# Patient Record
Sex: MALE | Race: WHITE | NOT HISPANIC OR LATINO | Employment: STUDENT | URBAN - METROPOLITAN AREA
[De-identification: names, ages, dates, MRNs, and addresses within clinical notes are randomized per-mention and may not be internally consistent; named-entity substitution may affect disease eponyms.]

---

## 2023-04-13 ENCOUNTER — LAB (OUTPATIENT)
Dept: LAB | Facility: LAB | Age: 23
End: 2023-04-13
Payer: COMMERCIAL

## 2023-04-13 ENCOUNTER — OFFICE VISIT (OUTPATIENT)
Dept: PEDIATRICS | Facility: CLINIC | Age: 23
End: 2023-04-13
Payer: COMMERCIAL

## 2023-04-13 VITALS
HEART RATE: 64 BPM | SYSTOLIC BLOOD PRESSURE: 129 MMHG | WEIGHT: 170.19 LBS | HEIGHT: 70 IN | BODY MASS INDEX: 24.37 KG/M2 | DIASTOLIC BLOOD PRESSURE: 70 MMHG

## 2023-04-13 DIAGNOSIS — R53.83 FATIGUE, UNSPECIFIED TYPE: ICD-10-CM

## 2023-04-13 DIAGNOSIS — Z00.00 ENCOUNTER FOR ROUTINE ADULT HEALTH EXAMINATION WITHOUT ABNORMAL FINDINGS: ICD-10-CM

## 2023-04-13 DIAGNOSIS — Z11.3 ROUTINE SCREENING FOR STI (SEXUALLY TRANSMITTED INFECTION): ICD-10-CM

## 2023-04-13 DIAGNOSIS — Z00.00 ENCOUNTER FOR ROUTINE ADULT HEALTH EXAMINATION WITHOUT ABNORMAL FINDINGS: Primary | ICD-10-CM

## 2023-04-13 PROBLEM — F41.9 ANXIETY: Status: RESOLVED | Noted: 2023-04-13 | Resolved: 2023-04-13

## 2023-04-13 PROBLEM — R10.9 ABDOMINAL PAIN: Status: RESOLVED | Noted: 2023-04-13 | Resolved: 2023-04-13

## 2023-04-13 PROBLEM — J03.00 STREP TONSILLITIS: Status: RESOLVED | Noted: 2023-04-13 | Resolved: 2023-04-13

## 2023-04-13 PROBLEM — M93.20 OSTEOCHONDRITIS DISSECANS: Status: RESOLVED | Noted: 2023-04-13 | Resolved: 2023-04-13

## 2023-04-13 PROBLEM — D56.3 BETA THALASSEMIA MINOR: Status: ACTIVE | Noted: 2023-04-13

## 2023-04-13 PROBLEM — M84.374A: Status: RESOLVED | Noted: 2023-04-13 | Resolved: 2023-04-13

## 2023-04-13 PROBLEM — H69.92 DYSFUNCTION OF LEFT EUSTACHIAN TUBE: Status: RESOLVED | Noted: 2021-07-27 | Resolved: 2023-04-13

## 2023-04-13 PROBLEM — H61.23 BILATERAL IMPACTED CERUMEN: Status: RESOLVED | Noted: 2023-04-13 | Resolved: 2023-04-13

## 2023-04-13 PROBLEM — H66.92 LEFT ACUTE OTITIS MEDIA: Status: RESOLVED | Noted: 2023-04-13 | Resolved: 2023-04-13

## 2023-04-13 PROBLEM — H92.09 EAR PAIN: Status: RESOLVED | Noted: 2023-04-13 | Resolved: 2023-04-13

## 2023-04-13 PROBLEM — F12.10 MARIJUANA ABUSE: Status: RESOLVED | Noted: 2023-04-13 | Resolved: 2023-04-13

## 2023-04-13 PROBLEM — M25.571 ACUTE RIGHT ANKLE PAIN: Status: RESOLVED | Noted: 2023-04-13 | Resolved: 2023-04-13

## 2023-04-13 PROBLEM — H93.19 TINNITUS: Status: RESOLVED | Noted: 2021-07-27 | Resolved: 2023-04-13

## 2023-04-13 PROBLEM — R00.2 PALPITATIONS: Status: RESOLVED | Noted: 2023-04-13 | Resolved: 2023-04-13

## 2023-04-13 PROBLEM — S93.401A SPRAIN OF RIGHT ANKLE: Status: RESOLVED | Noted: 2023-04-13 | Resolved: 2023-04-13

## 2023-04-13 PROBLEM — E80.6 HYPERBILIRUBINEMIA: Status: RESOLVED | Noted: 2023-04-13 | Resolved: 2023-04-13

## 2023-04-13 LAB
ALANINE AMINOTRANSFERASE (SGPT) (U/L) IN SER/PLAS: 15 U/L (ref 10–52)
ALBUMIN (G/DL) IN SER/PLAS: 4.8 G/DL (ref 3.4–5)
ALKALINE PHOSPHATASE (U/L) IN SER/PLAS: 64 U/L (ref 33–120)
ANION GAP IN SER/PLAS: 11 MMOL/L (ref 10–20)
ASPARTATE AMINOTRANSFERASE (SGOT) (U/L) IN SER/PLAS: 17 U/L (ref 9–39)
BILIRUBIN TOTAL (MG/DL) IN SER/PLAS: 1.2 MG/DL (ref 0–1.2)
CALCIUM (MG/DL) IN SER/PLAS: 9.9 MG/DL (ref 8.6–10.6)
CARBON DIOXIDE, TOTAL (MMOL/L) IN SER/PLAS: 27 MMOL/L (ref 21–32)
CHLAMYDIA TRACH., AMPLIFIED: NEGATIVE
CHLORIDE (MMOL/L) IN SER/PLAS: 106 MMOL/L (ref 98–107)
CHOLESTEROL (MG/DL) IN SER/PLAS: 153 MG/DL (ref 0–199)
CHOLESTEROL IN HDL (MG/DL) IN SER/PLAS: 33.1 MG/DL
CHOLESTEROL/HDL RATIO: 4.6
CREATININE (MG/DL) IN SER/PLAS: 0.81 MG/DL (ref 0.5–1.3)
GFR MALE: >90 ML/MIN/1.73M2
GLUCOSE (MG/DL) IN SER/PLAS: 80 MG/DL (ref 74–99)
LDL: 56 MG/DL (ref 0–119)
N. GONORRHEA, AMPLIFIED: NEGATIVE
NON HDL CHOLESTEROL: 120 MG/DL (ref 0–149)
POTASSIUM (MMOL/L) IN SER/PLAS: 4.2 MMOL/L (ref 3.5–5.3)
PROTEIN TOTAL: 7.1 G/DL (ref 6.4–8.2)
SODIUM (MMOL/L) IN SER/PLAS: 140 MMOL/L (ref 136–145)
TRIGLYCERIDE (MG/DL) IN SER/PLAS: 318 MG/DL (ref 0–149)
UREA NITROGEN (MG/DL) IN SER/PLAS: 12 MG/DL (ref 6–23)
VLDL: 64 MG/DL (ref 0–40)

## 2023-04-13 PROCEDURE — 87591 N.GONORRHOEAE DNA AMP PROB: CPT

## 2023-04-13 PROCEDURE — 99395 PREV VISIT EST AGE 18-39: CPT | Performed by: PEDIATRICS

## 2023-04-13 PROCEDURE — 87389 HIV-1 AG W/HIV-1&-2 AB AG IA: CPT

## 2023-04-13 PROCEDURE — 85025 COMPLETE CBC W/AUTO DIFF WBC: CPT

## 2023-04-13 PROCEDURE — 36415 COLL VENOUS BLD VENIPUNCTURE: CPT

## 2023-04-13 PROCEDURE — 87491 CHLMYD TRACH DNA AMP PROBE: CPT

## 2023-04-13 PROCEDURE — 80061 LIPID PANEL: CPT

## 2023-04-13 PROCEDURE — 80053 COMPREHEN METABOLIC PANEL: CPT

## 2023-04-13 NOTE — PROGRESS NOTES
Subjective     Rayray Alfaro is here with his mother for his annual WCC.    Parental Issues:  Questions or concerns:     He is having some left sided groin pain and wants to make sure he doesn't have a hernita.  The pain occurs when he bends forward or strains.  It has been present for about a month.  Wants STI testing due to having unprotected sex -- no symptoms.    He has had an intermittent feeling of dizziness/dehydration at times -- no syncope.    Nutrition, Elimination, and Sleep:  Nutrition:  well-balanced diet, takes foods from each food group  Elimination:  normal frequency and quality of stool  Sleep:  normal for age    Social:  Peer relations:  no concerns  Family relations:  no concerns  School performance:  no concerns  Teen questionnaire:  reviewed  Activities:  working as an     Using Marijuana intermittently, but not excessively    Confidential Adolescent Questionnaire Reviewed and Discussed      Objective   Growth chart reviewed.  General:  Well-appearing  Well-hydrated  No acute distress   Head:  Normocephalic   Eyes:  Lids and conjunctivae normal  Sclerae white  Pupils equal and reactive   ENT:  Ears:  TMs normal bilaterally  Mouth:  mucosa moist; no visible lesions  Throat:  OP moist and clear; uvula midline  Neck:  supple; no thyroid enlargement   Respiratory:  Respiratory rate:  normal  Air exchange:  normal   Adventitious breath sounds:  none  Accessory muscle use:  none   Heart:  Rate and rhythm:  regular  Murmur:  none    Abdomen:  Palpation:  soft, non-tender, non-distended, no masses  Organs:  no HSM  Bowel sounds:  normal   :  Normal external genitalia  Blayne stage:  V No obvious hernia   MSK: Range of motion:  grossly normal in all joints  Swelling:  none  Muscle bulk and strength:  grossly normal   Skin:  Warm and well-perfused  No rashes   Lymphatic: No nodes larger than 1 cm palpated  No firm or fixed nodes palpated   Neuro:  Alert  Moves all extremities  spontaneously  CN:  grossly intact  Tone:  normal      Assessment/Plan   Rayray Alfaro is a healthy and thriving teenager.    - Anticipatory guidance regarding development, safety, nutrition, physical activity, and sleep reviewed.  - Growth:  appropriate for age  - Development:  active and social   - Social:  Appropriate for age.  Confidential adolescent questionnaire reviewed and discussed. Age appropriate anticipatory guidance given.  - Vaccines:  as documented  - Return in 1 year for annual well exam or sooner if concerns arise.  -Rayray will start a workout programming incorporating core strength and stretching -- consider seeing general surgery if symptoms of a hernia persist  -Screening labs were ordered.  -Hearing was done today at Rayray's request

## 2023-04-14 LAB
BASOPHILS (10*3/UL) IN BLOOD BY AUTOMATED COUNT: 0.03 X10E9/L (ref 0–0.1)
BASOPHILS/100 LEUKOCYTES IN BLOOD BY AUTOMATED COUNT: 0.4 % (ref 0–2)
EOSINOPHILS (10*3/UL) IN BLOOD BY AUTOMATED COUNT: 0.12 X10E9/L (ref 0–0.7)
EOSINOPHILS/100 LEUKOCYTES IN BLOOD BY AUTOMATED COUNT: 1.7 % (ref 0–6)
ERYTHROCYTE DISTRIBUTION WIDTH (RATIO) BY AUTOMATED COUNT: 17.7 % (ref 11.5–14.5)
ERYTHROCYTE MEAN CORPUSCULAR HEMOGLOBIN CONCENTRATION (G/DL) BY AUTOMATED: 29.5 G/DL (ref 32–36)
ERYTHROCYTE MEAN CORPUSCULAR VOLUME (FL) BY AUTOMATED COUNT: 64 FL (ref 80–100)
ERYTHROCYTES (10*6/UL) IN BLOOD BY AUTOMATED COUNT: 6.92 X10E12/L (ref 4.5–5.9)
HEMATOCRIT (%) IN BLOOD BY AUTOMATED COUNT: 44.1 % (ref 41–52)
HEMOGLOBIN (G/DL) IN BLOOD: 13 G/DL (ref 13.5–17.5)
HIV 1/ 2 AG/AB SCREEN: NONREACTIVE
IMMATURE GRANULOCYTES/100 LEUKOCYTES IN BLOOD BY AUTOMATED COUNT: 0.4 % (ref 0–0.9)
LEUKOCYTES (10*3/UL) IN BLOOD BY AUTOMATED COUNT: 7.2 X10E9/L (ref 4.4–11.3)
LYMPHOCYTES (10*3/UL) IN BLOOD BY AUTOMATED COUNT: 2.11 X10E9/L (ref 1.2–4.8)
LYMPHOCYTES/100 LEUKOCYTES IN BLOOD BY AUTOMATED COUNT: 29.4 % (ref 13–44)
MONOCYTES (10*3/UL) IN BLOOD BY AUTOMATED COUNT: 0.59 X10E9/L (ref 0.1–1)
MONOCYTES/100 LEUKOCYTES IN BLOOD BY AUTOMATED COUNT: 8.2 % (ref 2–10)
NEUTROPHILS (10*3/UL) IN BLOOD BY AUTOMATED COUNT: 4.29 X10E9/L (ref 1.2–7.7)
NEUTROPHILS/100 LEUKOCYTES IN BLOOD BY AUTOMATED COUNT: 59.9 % (ref 40–80)
NRBC (PER 100 WBCS) BY AUTOMATED COUNT: 0 /100 WBC (ref 0–0)
PLATELETS (10*3/UL) IN BLOOD AUTOMATED COUNT: 297 X10E9/L (ref 150–450)

## 2023-08-18 ENCOUNTER — OFFICE VISIT (OUTPATIENT)
Dept: PRIMARY CARE | Facility: CLINIC | Age: 23
End: 2023-08-18
Payer: COMMERCIAL

## 2023-08-18 VITALS
BODY MASS INDEX: 24.22 KG/M2 | OXYGEN SATURATION: 98 % | HEIGHT: 71 IN | DIASTOLIC BLOOD PRESSURE: 73 MMHG | WEIGHT: 173 LBS | SYSTOLIC BLOOD PRESSURE: 118 MMHG | HEART RATE: 59 BPM

## 2023-08-18 DIAGNOSIS — F41.9 ANXIETY: ICD-10-CM

## 2023-08-18 DIAGNOSIS — H93.13 TINNITUS OF BOTH EARS: Primary | ICD-10-CM

## 2023-08-18 DIAGNOSIS — D50.8 OTHER IRON DEFICIENCY ANEMIA: ICD-10-CM

## 2023-08-18 PROBLEM — H69.92 DYSFUNCTION OF LEFT EUSTACHIAN TUBE: Status: ACTIVE | Noted: 2021-07-27

## 2023-08-18 PROCEDURE — 99204 OFFICE O/P NEW MOD 45 MIN: CPT | Performed by: STUDENT IN AN ORGANIZED HEALTH CARE EDUCATION/TRAINING PROGRAM

## 2023-08-18 RX ORDER — MULTIVITAMIN
1 TABLET ORAL
COMMUNITY

## 2023-08-18 RX ORDER — FERROUS SULFATE 325(65) MG
65 TABLET ORAL
Qty: 36 TABLET | Refills: 0 | Status: SHIPPED | OUTPATIENT
Start: 2023-08-18 | End: 2023-11-08

## 2023-08-18 NOTE — PATIENT INSTRUCTIONS
Thank you for coming in today!    Start iron supplement 3 times per week for next 3 months, then switch to an over the counter daily multivitamin WITH iron in it after that. You should continue the daily multivitamin with iron indefinitely.    Psychology referral within . You may also try [facility] in [place] or [facility].     Dermatology to evaluate and possibly remove that likely ingrown hair on your chest.     Follow up in 4-6 months with repeat cholesterol panel and blood counts at that time. Your TRIGLYCERIDES were high but not to point of needing medication but you should watch your fatty/fried/fast food intake to prevent this from becoming an issue long term.     Would recommend getting updated COVID shot and Flu shot this fall.     Let me know if you need anything else in the meantime!    Best,  Dr. Mendoza

## 2023-08-18 NOTE — PROGRESS NOTES
Rayray Alfaro is a 22 y.o. male seen in Clinic at /[facility] by Dr. Cosme Mendoza on 08/18/23 for routine care, as well as for management of the following chronic medical conditions: jannet thal minor, anxiety. He presents today to establish care.     #Tinnitus   -    - constantly exposed to high pitched sounds  - R ear feels obstructed  - higher pitched ringing in R ear, some asymmetry in hearing   - no trauma to the ear that he is aware of, uses Q-tips  - has been seen by ENT in the past, as well as audiology (last in 2021)   - NOT wooshing, more ringing  - does have seasonal allergies, has not been taking Allegra recently, also has done Flonase in the past   [X] ear wax removed from canals bilaterally today  [  ] updated audiology eval   [  ] mild anemia in setting of Beta Thal minor, MCV in low 60s, start iron supplementation, as may be slightly contributing     #Beta Thal Minor,   - recent CBC with mild microcytic anemia  - Iron supplementation   - long term MVI with iron     #Anxiety   - prior counseling  - may consider re-engagement   - money is stressor  [  ] re-referral today     Past Medical History: none   Past Medical History:   Diagnosis Date    Abdominal pain 04/13/2023    Acute right ankle pain 04/13/2023    Anxiety 04/13/2023    Dysfunction of left eustachian tube 07/27/2021    Ear pain 04/13/2023    Hyperbilirubinemia 04/13/2023    Left acute otitis media 04/13/2023    Marijuana abuse 04/13/2023    Osteochondritis dissecans 04/13/2023    Palpitations 04/13/2023    Sprain of right ankle 04/13/2023    Strep tonsillitis 04/13/2023    Stress fracture of tarsal bone of right foot 04/13/2023    Tinnitus 07/27/2021     Subspecialty Medical Care: prior ENT     Past Surgical History: urologic procedure in infancy   No past surgical history on file.    Medications:   Current Outpatient Medications:     multivitamin tablet, Take 1 tablet by mouth once daily., Disp: , Rfl:   Pharmacy: [facility]  "([place])    Allergies: NKDA; seasonal allergies   No Known Allergies    Immunizations: Tdap 2020-->due 2030; recommend Flu and COVID vaccine this fall     Family History:   - Breast Cancer (grandmother, late onset)   No family history on file.    Social History:   Home/Living Situation: lives at with mother, father, brother, and sister; feels safe at home   Education: [employer] for high school   Employment/Work/Vocational: works in music industry   Activities: music, hanging out with friends  Drug Use: MJ use, no tobacco use, rare alcohol use   Diet: \"could be better\"   Sexuality/Contraception/Menstrual History:   Suicide/Depression/Anxiety: known anxiety   Sleep: no sleep concerns   Legal/Guardian: mom and dad, home [phone]  Contact Information: [phone]    Visit Vitals  /73   Pulse 59   Ht 1.803 m (5' 11\")   Wt 78.5 kg (173 lb)   SpO2 98%   BMI 24.13 kg/m²   Smoking Status Former   BSA 1.98 m²      PHYSICAL EXAM:   General: well appearing, NAD, pleasant and engaged in encounter    HEENT: NCAT, MMM; Tms initially obstructed by wax but successfully removed with curette bilaterally   CV: RRR, no m/r/g  PULM: CTAB, non-labored respirations   ABD: soft, NT, ND, + bowel sounds   : no suprapubic or CVA tenderness   EXT: WWP, no significant edema   SKIN: no rashes noted   NEURO: A&Ox4, symmetric facies, no gross motor or sensory deficits, normal gait  PSYCH: pleasant mood, appropriate affect     Assessment/Plan    Rayray Alfaro is a 22 y.o. male seen in Clinic at /[facility] by Dr. Cosme Mendoza on 08/18/23 for routine care, as well as for management of the following chronic medical conditions: jannet thal minor, anxiety. He presents today to establish care.     #Tinnitus   -    - constantly exposed to high pitched sounds  - R ear feels obstructed  - higher pitched ringing in R ear, some asymmetry in hearing   - no trauma to the ear that he is aware of, uses " Q-tips  - has been seen by ENT in the past, as well as audiology (last in 2021)   - NOT wooshing, more ringing  - does have seasonal allergies, has not been taking Allegra recently, also has done Flonase in the past   [X] ear wax removed from canals bilaterally today  [  ] updated audiology eval   [  ] mild anemia in setting of Beta Thal minor, MCV in low 60s, start iron supplementation, as may be slightly contributing     #Beta Thal Minor,   - recent CBC with mild microcytic anemia  - Iron supplementation   - long term MVI with iron     #Anxiety   - prior counseling  - may consider re-engagement   - money is stressor  [  ] re-referral today     #Health Maintenance   - Vision, Hearing screens: audiology referral today, recommend vision eval (never worn corrective lenses in the past)   - Counseling regarding alcohol/tobacco/drug use: MJ use as above   - Depression screen: +anxiety (prior counseling, interested in re-engaging), depression   - BMI, Lipid, A1C screening and nutritional/exercise counseling: recent labs reviewed; repeat lipids in 3-6 months   - Blood Pressure: WNL   - Safe Sexual Practices, STI, HIV screening: recent negative testing 04/2023    #Transition of Care/Young Adult Care  - Health Literacy Assessment: excellent   - Medications: Active medications reviewed and updated  - Allergies: Reviewed and updated   - Immunizations: UTD, annual flu and updated COVID boosters recommended   - Resources Provided: psychology, dermatology, audiology, Iron supplementation     Return to clinic in 4-6 months for follow-up.     Cosme Mendoza MD  Internal Medicine-Pediatrics   [facility] [place] [address], [address]  P: [phone], F: [phone]

## 2023-10-24 ENCOUNTER — OFFICE VISIT (OUTPATIENT)
Dept: PRIMARY CARE | Facility: CLINIC | Age: 23
End: 2023-10-24
Payer: COMMERCIAL

## 2023-10-24 VITALS
WEIGHT: 180 LBS | OXYGEN SATURATION: 98 % | HEART RATE: 68 BPM | SYSTOLIC BLOOD PRESSURE: 114 MMHG | BODY MASS INDEX: 25.2 KG/M2 | DIASTOLIC BLOOD PRESSURE: 71 MMHG | HEIGHT: 71 IN

## 2023-10-24 DIAGNOSIS — Z86.69 HISTORY OF IMPACTED EAR WAX: Primary | ICD-10-CM

## 2023-10-24 DIAGNOSIS — D50.8 OTHER IRON DEFICIENCY ANEMIA: ICD-10-CM

## 2023-10-24 DIAGNOSIS — Z23 IMMUNIZATION DUE: ICD-10-CM

## 2023-10-24 DIAGNOSIS — E78.1 HYPERTRIGLYCERIDEMIA: ICD-10-CM

## 2023-10-24 DIAGNOSIS — H93.13 TINNITUS OF BOTH EARS: ICD-10-CM

## 2023-10-24 PROCEDURE — 90686 IIV4 VACC NO PRSV 0.5 ML IM: CPT | Performed by: STUDENT IN AN ORGANIZED HEALTH CARE EDUCATION/TRAINING PROGRAM

## 2023-10-24 PROCEDURE — 99213 OFFICE O/P EST LOW 20 MIN: CPT | Performed by: STUDENT IN AN ORGANIZED HEALTH CARE EDUCATION/TRAINING PROGRAM

## 2023-10-24 PROCEDURE — 90471 IMMUNIZATION ADMIN: CPT | Performed by: STUDENT IN AN ORGANIZED HEALTH CARE EDUCATION/TRAINING PROGRAM

## 2023-10-24 NOTE — PROGRESS NOTES
Rayray Alfaro is a 22 y.o. male seen in Clinic at /[facility] by Dr. Cosme Mendoza on 10/24/23 for routine care, as well as for management of the following chronic medical conditions: jannet thal minor, anxiety. He presents today with concern for ear fullness. Bilateral Tms occluded by wax, successfully removed with curette without issue. Has been taking iron reliably for last ~3 weeks given beta thal minor with microcytic anemia and tinnitus concerns. Will repeat labs if continues to reliably take at time of next visit. Audiology visit pending, has been busy with work but planning to schedule. Flu shot given today, updated COVID booster advised this fall.    ACUTE CONCERNS:   #Ear Fullness  - bilateral cerumen impaction, s/p successful removal with curette     CHRONIC MEDICAL CONDITIONS   #Tinnitus   -    - constantly exposed to high pitched sounds  - higher pitched ringing in R ear, some asymmetry in hearing   - no trauma to the ear that he is aware of, uses Q-tips  - has been seen by ENT in the past, as well as audiology (last in 2021)   - NOT wooshing, more ringing  - does have seasonal allergies, has not been taking Allegra recently, also has done Flonase in the past   [  ] previously referred to audiology, recommended follow up   - mild anemia in setting of Beta Thal minor, MCV in low 60s, previously recommended iron supplementation, as may be slightly contributing: compliance in last few weeks     #Beta Thal Minor,   - recent CBC with mild microcytic anemia  - Iron supplementation   - long term MVI with iron   [  ] repeat labs at next visit pending continued compliance     #Anxiety   - prior counseling  - may consider re-engagement; previously referred   - money is stressor    #Elevated Tgs  - per lipid screening in 2023  - low HDL but reassuring TC and LDL  - healthy dietary habits discussed/reviewed    Past Medical History: none   Past Medical History:   Diagnosis Date    Abdominal pain  "04/13/2023    Acute right ankle pain 04/13/2023    Anxiety 04/13/2023    Dysfunction of left eustachian tube 07/27/2021    Ear pain 04/13/2023    Hyperbilirubinemia 04/13/2023    Left acute otitis media 04/13/2023    Marijuana abuse 04/13/2023    Osteochondritis dissecans 04/13/2023    Palpitations 04/13/2023    Sprain of right ankle 04/13/2023    Strep tonsillitis 04/13/2023    Stress fracture of tarsal bone of right foot 04/13/2023    Tinnitus 07/27/2021     Subspecialty Medical Care: prior ENT     Past Surgical History: urologic procedure in infancy   No past surgical history on file.    Medications:   Current Outpatient Medications:     ferrous sulfate (FerrouSuL) 325 (65 Fe) MG tablet, Take 1 tablet (65 mg of iron) by mouth once a day on Monday, Wednesday, and Friday., Disp: 36 tablet, Rfl: 0    multivitamin tablet, Take 1 tablet by mouth once daily., Disp: , Rfl:   Pharmacy: [facility] ([place])    Allergies: NKDA; seasonal allergies   No Known Allergies    Immunizations: Tdap 2020-->due 2030; recommend Flu and COVID vaccine this fall   - Flu today     Family History:   - Breast Cancer (grandmother, late onset)   No family history on file.    Social History:   Home/Living Situation: lives at with mother, father, brother, and sister; feels safe at home   Education: [employer] for high school   Employment/Work/Vocational: works in music industry   Activities: music, hanging out with friends  Drug Use: MJ use, no tobacco use, rare alcohol use   Diet: \"could be better\"   Sexuality/Contraception/Menstrual History:   Suicide/Depression/Anxiety: known anxiety   Sleep: no sleep concerns   Legal/Guardian: mom and dad, home [phone]  Contact Information: [phone]    Visit Vitals  /71   Pulse 68   Ht 1.803 m (5' 11\")   Wt 81.6 kg (180 lb)   SpO2 98%   BMI 25.10 kg/m²   Smoking Status Every Day   BSA 2.02 m²      PHYSICAL EXAM:   General: well appearing, NAD, pleasant and engaged in encounter  "   HEENT: NCAT, MMM; Tms initially obstructed by wax but successfully removed with curette bilaterally   CV: RRR, no m/r/g  PULM: CTAB, non-labored respirations   ABD: soft, NT, ND, + bowel sounds   : no suprapubic or CVA tenderness   EXT: WWP, no significant edema   SKIN: no rashes noted   NEURO: A&Ox4, symmetric facies, no gross motor or sensory deficits, normal gait  PSYCH: pleasant mood, appropriate affect     Assessment/Plan    Rayray Alfaro is a 22 y.o. male seen in Clinic at /[facility] by Dr. Cosme Mendoza on 10/24/23 for routine care, as well as for management of the following chronic medical conditions: jannet thal minor, anxiety. He presents today with concern for ear fullness. Bilateral Tms occluded by wax, successfully removed with curette without issue. Has been taking iron reliably for last ~3 weeks given beta thal minor with microcytic anemia and tinnitus concerns. Will repeat labs if continues to reliably take at time of next visit. Audiology visit pending, has been busy with work but planning to schedule. Flu shot given today, updated COVID booster advised this fall.    ACUTE CONCERNS:   #Ear Fullness  - bilateral cerumen impaction, s/p successful removal with curette     CHRONIC MEDICAL CONDITIONS   #Tinnitus   -    - constantly exposed to high pitched sounds  - higher pitched ringing in R ear, some asymmetry in hearing   - no trauma to the ear that he is aware of, uses Q-tips  - has been seen by ENT in the past, as well as audiology (last in 2021)   - NOT wooshing, more ringing  - does have seasonal allergies, has not been taking Allegra recently, also has done Flonase in the past   [  ] previously referred to audiology, recommended follow up   - mild anemia in setting of Beta Thal minor, MCV in low 60s, previously recommended iron supplementation, as may be slightly contributing: compliance in last few weeks     #Beta Thal Minor,   - recent CBC with mild microcytic anemia  -  Iron supplementation   - long term MVI with iron   [  ] repeat labs at next visit pending continued compliance     #Anxiety   - prior counseling  - may consider re-engagement; previously referred   - money is stressor    #Elevated Tgs  - per lipid screening in 2023  - low HDL but reassuring TC and LDL  - healthy dietary habits discussed/reviewed    #Health Maintenance   - Vision, Hearing screens: audiology referral pending, recommend vision eval (never worn corrective lenses in the past)   - Counseling regarding alcohol/tobacco/drug use: MJ use as above   - Depression screen: +anxiety (prior counseling, interested in re-engaging), depression   - BMI, Lipid, A1C screening and nutritional/exercise counseling: recent labs reviewed; repeat lipids in 3-6 months   - Blood Pressure: WNL   - Safe Sexual Practices, STI, HIV screening: recent negative testing 04/2023    #Transition of Care/Young Adult Care  - Health Literacy Assessment: excellent   - Medications: Active medications reviewed and updated  - Allergies: Reviewed and updated   - Immunizations: UTD, annual flu and updated COVID boosters recommended--Flu today   - Resources Provided: no new referrals     Return to clinic in 4-6 months for follow-up.     Cosme Mendoza MD  Internal Medicine-Pediatrics   [facility] [place] [address], [address]  P: [phone], F: [phone]

## 2023-11-08 DIAGNOSIS — D50.8 OTHER IRON DEFICIENCY ANEMIA: ICD-10-CM

## 2023-11-08 RX ORDER — FERROUS SULFATE 325(65) MG
65 TABLET ORAL
Qty: 36 TABLET | Refills: 3 | Status: SHIPPED | OUTPATIENT
Start: 2023-11-08 | End: 2024-10-09

## 2024-02-09 ENCOUNTER — OFFICE VISIT (OUTPATIENT)
Dept: PRIMARY CARE | Facility: CLINIC | Age: 24
End: 2024-02-09
Payer: COMMERCIAL

## 2024-02-09 ENCOUNTER — HOSPITAL ENCOUNTER (OUTPATIENT)
Dept: RADIOLOGY | Facility: CLINIC | Age: 24
Discharge: HOME | End: 2024-02-09
Payer: COMMERCIAL

## 2024-02-09 ENCOUNTER — LAB (OUTPATIENT)
Dept: LAB | Facility: LAB | Age: 24
End: 2024-02-09
Payer: COMMERCIAL

## 2024-02-09 VITALS
BODY MASS INDEX: 24.41 KG/M2 | WEIGHT: 175 LBS | HEART RATE: 64 BPM | DIASTOLIC BLOOD PRESSURE: 73 MMHG | OXYGEN SATURATION: 95 % | SYSTOLIC BLOOD PRESSURE: 110 MMHG

## 2024-02-09 DIAGNOSIS — J18.9 ATYPICAL PNEUMONIA: ICD-10-CM

## 2024-02-09 DIAGNOSIS — J02.9 PHARYNGITIS, UNSPECIFIED ETIOLOGY: ICD-10-CM

## 2024-02-09 DIAGNOSIS — J18.9 ATYPICAL PNEUMONIA: Primary | ICD-10-CM

## 2024-02-09 DIAGNOSIS — E78.1 HYPERTRIGLYCERIDEMIA: ICD-10-CM

## 2024-02-09 DIAGNOSIS — D50.9 IRON DEFICIENCY ANEMIA, UNSPECIFIED IRON DEFICIENCY ANEMIA TYPE: ICD-10-CM

## 2024-02-09 LAB
BASOPHILS # BLD AUTO: 0.01 X10*3/UL (ref 0–0.1)
BASOPHILS NFR BLD AUTO: 0.2 %
EOSINOPHIL # BLD AUTO: 0.03 X10*3/UL (ref 0–0.7)
EOSINOPHIL NFR BLD AUTO: 0.7 %
ERYTHROCYTE [DISTWIDTH] IN BLOOD BY AUTOMATED COUNT: 18 % (ref 11.5–14.5)
FERRITIN SERPL-MCNC: 367 NG/ML (ref 20–300)
HCT VFR BLD AUTO: 47 % (ref 41–52)
HGB BLD-MCNC: 14.3 G/DL (ref 13.5–17.5)
HGB RETIC QN: 19 PG (ref 28–38)
IMM GRANULOCYTES # BLD AUTO: 0.01 X10*3/UL (ref 0–0.7)
IMM GRANULOCYTES NFR BLD AUTO: 0.2 % (ref 0–0.9)
IMMATURE RETIC FRACTION: 3 %
IRON SATN MFR SERPL: 37 % (ref 25–45)
IRON SERPL-MCNC: 120 UG/DL (ref 35–150)
LYMPHOCYTES # BLD AUTO: 2 X10*3/UL (ref 1.2–4.8)
LYMPHOCYTES NFR BLD AUTO: 49.5 %
MCH RBC QN AUTO: 19 PG (ref 26–34)
MCHC RBC AUTO-ENTMCNC: 30.4 G/DL (ref 32–36)
MCV RBC AUTO: 62 FL (ref 80–100)
MONOCYTES # BLD AUTO: 0.52 X10*3/UL (ref 0.1–1)
MONOCYTES NFR BLD AUTO: 12.9 %
NEUTROPHILS # BLD AUTO: 1.47 X10*3/UL (ref 1.2–7.7)
NEUTROPHILS NFR BLD AUTO: 36.5 %
NRBC BLD-RTO: 0 /100 WBCS (ref 0–0)
PLATELET # BLD AUTO: 221 X10*3/UL (ref 150–450)
POC RAPID STREP: NEGATIVE
RBC # BLD AUTO: 7.54 X10*6/UL (ref 4.5–5.9)
RETICS #: 0.04 X10*6/UL (ref 0.02–0.12)
RETICS/RBC NFR AUTO: 0.5 % (ref 0.5–2)
TIBC SERPL-MCNC: 327 UG/DL (ref 240–445)
UIBC SERPL-MCNC: 207 UG/DL (ref 110–370)
WBC # BLD AUTO: 4 X10*3/UL (ref 4.4–11.3)

## 2024-02-09 PROCEDURE — 36415 COLL VENOUS BLD VENIPUNCTURE: CPT

## 2024-02-09 PROCEDURE — 85025 COMPLETE CBC W/AUTO DIFF WBC: CPT

## 2024-02-09 PROCEDURE — 82728 ASSAY OF FERRITIN: CPT

## 2024-02-09 PROCEDURE — 83540 ASSAY OF IRON: CPT

## 2024-02-09 PROCEDURE — 87651 STREP A DNA AMP PROBE: CPT

## 2024-02-09 PROCEDURE — 87880 STREP A ASSAY W/OPTIC: CPT | Performed by: STUDENT IN AN ORGANIZED HEALTH CARE EDUCATION/TRAINING PROGRAM

## 2024-02-09 PROCEDURE — 80061 LIPID PANEL: CPT

## 2024-02-09 PROCEDURE — 71046 X-RAY EXAM CHEST 2 VIEWS: CPT

## 2024-02-09 PROCEDURE — 83550 IRON BINDING TEST: CPT

## 2024-02-09 PROCEDURE — 85045 AUTOMATED RETICULOCYTE COUNT: CPT

## 2024-02-09 PROCEDURE — 99214 OFFICE O/P EST MOD 30 MIN: CPT | Performed by: STUDENT IN AN ORGANIZED HEALTH CARE EDUCATION/TRAINING PROGRAM

## 2024-02-09 PROCEDURE — 71046 X-RAY EXAM CHEST 2 VIEWS: CPT | Performed by: RADIOLOGY

## 2024-02-09 RX ORDER — DOXYCYCLINE 100 MG/1
100 CAPSULE ORAL 2 TIMES DAILY
Qty: 10 CAPSULE | Refills: 0 | Status: SHIPPED | OUTPATIENT
Start: 2024-02-09 | End: 2024-02-14

## 2024-02-09 RX ORDER — PREDNISONE 20 MG/1
40 TABLET ORAL DAILY
Qty: 10 TABLET | Refills: 0 | Status: SHIPPED | OUTPATIENT
Start: 2024-02-09 | End: 2024-02-14

## 2024-02-09 NOTE — PATIENT INSTRUCTIONS
Strep testing sent     Chest X-Ray in [address]  Labs in [address] for iron levels    Prednisone and Doxycycline for atypical pneumonia  Both for 5 days  Prednisone once daily in the morning with food  Doxycycline twice daily in the morning and evening; also should be taken with food    Best  Dr. Mendoza

## 2024-02-09 NOTE — PROGRESS NOTES
Rayray Alfaro is a 23 y.o. male seen in Clinic at /[facility] by Dr. Cosme Mendoza on 02/09/24 for routine care, as well as for management of the following chronic medical conditions: jannet thal minor, anxiety. He presents today with concern for sore throat, fever, myalgias, and chest congestion.     ACUTE CONCERNS:   #Pharyngitis, Concern for Atypical PNA, possible Flu   - Symptoms started 4-5 days ago  - Recent sick contacts with positive strep testing   - Notes he has had fever up to 102 in preceding days, myalgias, congestion, sore throat  - Temp 98.7 in office today   - No profound SOB  - Does not some chest pain/discomfort  - Examination today in office with diffuse wheezing, L sided crackles  - No respiratory distress or labored respirations   [  ] given pulmonary exam, CXR  [  ] strep testing given positive testing in close contacts: rapid testing negative  [  ] high concern for atypical PNA; with degree of wheezing, will treat with both antibiotics (Doxy) and steroids (Prednisone 40mg daily x5 days)  [  ] home COVID testing negative; defer Flu testing, as patient now 4-5 days out from symptom onset, no indication for Oseltamivir at this time    CHRONIC MEDICAL CONDITIONS   #Tinnitus   -    - constantly exposed to high pitched sounds  - higher pitched ringing in R ear, some asymmetry in hearing   - no trauma to the ear that he is aware of, uses Q-tips  - has been seen by ENT in the past, as well as audiology (last in 2021)   - NOT wooshing, more ringing  - does have seasonal allergies, has not been taking Allegra recently, also has done Flonase in the past   [  ] previously referred to audiology, recommended follow up   - mild anemia in setting of Beta Thal minor, MCV in low 60s, previously recommended iron supplementation, as may be slightly contributing: compliance in last few weeks     #Beta Thal Minor,   - recent CBC with mild microcytic anemia  - Iron supplementation   - long term MVI  with iron   [  ] repeat labs at next visit pending continued compliance     #Anxiety   - prior counseling  - may consider re-engagement; previously referred   - money is stressor    #Elevated Tgs  - per lipid screening in 2023  - low HDL but reassuring TC and LDL  - healthy dietary habits discussed/reviewed    Past Medical History: none   Past Medical History:   Diagnosis Date    Abdominal pain 04/13/2023    Acute right ankle pain 04/13/2023    Anxiety 04/13/2023    Dysfunction of left eustachian tube 07/27/2021    Ear pain 04/13/2023    Hyperbilirubinemia 04/13/2023    Left acute otitis media 04/13/2023    Marijuana abuse 04/13/2023    Osteochondritis dissecans 04/13/2023    Palpitations 04/13/2023    Sprain of right ankle 04/13/2023    Strep tonsillitis 04/13/2023    Stress fracture of tarsal bone of right foot 04/13/2023    Tinnitus 07/27/2021     Subspecialty Medical Care: prior ENT     Past Surgical History: urologic procedure in infancy   No past surgical history on file.    Medications:   Current Outpatient Medications:     ferrous sulfate 325 (65 Fe) MG tablet, TAKE 1 TABLET (65 MG OF IRON) BY MOUTH ONCE A DAY ON MONDAY, WEDNESDAY, AND FRIDAY., Disp: 36 tablet, Rfl: 3    multivitamin tablet, Take 1 tablet by mouth once daily., Disp: , Rfl:     doxycycline (Vibramycin) 100 mg capsule, Take 1 capsule (100 mg) by mouth 2 times a day for 5 days. Take with at least 8 ounces (large glass) of water, do not lie down for 30 minutes after, Disp: 10 capsule, Rfl: 0    predniSONE (Deltasone) 20 mg tablet, Take 2 tablets (40 mg) by mouth once daily for 5 days., Disp: 10 tablet, Rfl: 0  Pharmacy: [facility] ([place])    Allergies: NKDA; seasonal allergies   No Known Allergies    Immunizations: Tdap 2020-->due 2030; recommend Flu and COVID vaccines remain UTD     Family History:   - Breast Cancer (grandmother, late onset)   No family history on file.    Social History:   Home/Living Situation: lives at with mother, father,  "brother, and sister; feels safe at home   Education: [employer] for high school   Employment/Work/Vocational: works in music industry   Activities: music, hanging out with friends  Drug Use: MJ use, no tobacco use, rare alcohol use   Diet: \"could be better\"   Sexuality/Contraception/Menstrual History:   Suicide/Depression/Anxiety: known anxiety   Sleep: no sleep concerns   Legal/Guardian: mom and dad, home [phone]  Contact Information: [phone]    Visit Vitals  /73   Pulse 64   Wt 79.4 kg (175 lb)   SpO2 95%   BMI 24.41 kg/m²   Smoking Status Every Day   BSA 1.99 m²      PHYSICAL EXAM:   General: well appearing, NAD, pleasant and engaged in encounter    HEENT: NCAT, MMM; Tms initially obstructed by wax but successfully removed with curette, pharyngeal erythema without exudates, +cervical adenopathy   CV: RRR, no m/r/g  PULM: diffuse wheezing with LLL crackles, non-labored respirations, repeat pulse ox assessment with good waveform 97%  ABD: soft, NT, ND, + bowel sounds   : no suprapubic or CVA tenderness   EXT: WWP, no significant edema   SKIN: no rashes noted   NEURO: A&Ox4, symmetric facies, no gross motor or sensory deficits, normal gait  PSYCH: pleasant mood, appropriate affect     Assessment/Plan    Rayray Alfaro is a 23 y.o. male seen in Clinic at /[facility] by Dr. Cosme Mendoza on 02/09/24 for routine care, as well as for management of the following chronic medical conditions: jannet thal minor, anxiety. He presents today with concern for sore throat, fever, myalgias, and chest congestion.     ACUTE CONCERNS:   #Pharyngitis, Concern for Atypical PNA, possible Flu   - Symptoms started 4-5 days ago  - Recent sick contacts with positive strep testing   - Notes he has had fever up to 102 in preceding days, myalgias, congestion, sore throat  - Temp 98.7 in office today   - No profound SOB  - Does not some chest pain/discomfort  - Examination today in office with diffuse " wheezing, L sided crackles  - No respiratory distress or labored respirations   [  ] given pulmonary exam, CXR  [  ] strep testing given positive testing in close contacts: rapid testing negative  [  ] high concern for atypical PNA; with degree of wheezing, will treat with both antibiotics (Doxy) and steroids (Prednisone 40mg daily x5 days)  [  ] home COVID testing negative; defer Flu testing, as patient now 4-5 days out from symptom onset, no indication for Oseltamivir at this time    CHRONIC MEDICAL CONDITIONS   #Tinnitus   -    - constantly exposed to high pitched sounds  - higher pitched ringing in R ear, some asymmetry in hearing   - no trauma to the ear that he is aware of, uses Q-tips  - has been seen by ENT in the past, as well as audiology (last in 2021)   - NOT wooshing, more ringing  - does have seasonal allergies, has not been taking Allegra recently, also has done Flonase in the past   [  ] previously referred to audiology, recommended follow up   - mild anemia in setting of Beta Thal minor, MCV in low 60s, previously recommended iron supplementation, as may be slightly contributing: compliance in last few weeks     #Beta Thal Minor,   - recent CBC with mild microcytic anemia  - Iron supplementation   - long term MVI with iron   [  ] repeat labs at next visit pending continued compliance     #Anxiety   - prior counseling  - may consider re-engagement; previously referred   - money is stressor    #Elevated Tgs  - per lipid screening in 2023  - low HDL but reassuring TC and LDL  - healthy dietary habits discussed/reviewed    #Health Maintenance   - Vision, Hearing screens: audiology referral pending, recommend vision eval (never worn corrective lenses in the past)   - Counseling regarding alcohol/tobacco/drug use: MJ use as above   - Depression screen: +anxiety (prior counseling, interested in re-engaging), depression   - BMI, Lipid, A1C screening and nutritional/exercise counseling: recent  labs reviewed; repeat lipids in 3-6 months (ordered today)   - Blood Pressure: WNL   - Safe Sexual Practices, STI, HIV screening: recent negative testing 04/2023    #Transition of Care/Young Adult Care  - Health Literacy Assessment: excellent   - Medications: Active medications reviewed and updated  - Allergies: Reviewed and updated   - Immunizations: UTD, annual flu and updated COVID boosters recommended  - Resources Provided: labs, CXR, strep testing, Steroids and Abx for Atypical PNA treatment    Cosme Mendoza MD  Internal Medicine-Pediatrics   [facility] [place] [address], [address]  P: [phone], F: [phone]

## 2024-02-10 LAB
CHOLEST SERPL-MCNC: 157 MG/DL (ref 0–199)
CHOLESTEROL/HDL RATIO: 5.5
HDLC SERPL-MCNC: 28.7 MG/DL
LDLC SERPL CALC-MCNC: 98 MG/DL
NON HDL CHOLESTEROL: 128 MG/DL (ref 0–149)
S PYO DNA THROAT QL NAA+PROBE: NOT DETECTED
TRIGL SERPL-MCNC: 151 MG/DL (ref 0–149)
VLDL: 30 MG/DL (ref 0–40)

## 2024-02-12 ENCOUNTER — TELEPHONE (OUTPATIENT)
Dept: PRIMARY CARE | Facility: CLINIC | Age: 24
End: 2024-02-12

## 2024-09-06 ENCOUNTER — APPOINTMENT (OUTPATIENT)
Dept: PRIMARY CARE | Facility: CLINIC | Age: 24
End: 2024-09-06
Payer: COMMERCIAL

## 2024-10-30 ENCOUNTER — APPOINTMENT (OUTPATIENT)
Dept: PRIMARY CARE | Facility: CLINIC | Age: 24
End: 2024-10-30
Payer: COMMERCIAL

## 2024-10-30 DIAGNOSIS — L73.1 INGROWN HAIR: ICD-10-CM

## 2024-10-30 DIAGNOSIS — L64.9 MALE PATTERN BALDNESS: Primary | ICD-10-CM

## 2024-10-30 DIAGNOSIS — Z23 IMMUNIZATION DUE: ICD-10-CM

## 2024-10-30 PROCEDURE — 90471 IMMUNIZATION ADMIN: CPT | Performed by: STUDENT IN AN ORGANIZED HEALTH CARE EDUCATION/TRAINING PROGRAM

## 2024-10-30 PROCEDURE — 99213 OFFICE O/P EST LOW 20 MIN: CPT | Performed by: STUDENT IN AN ORGANIZED HEALTH CARE EDUCATION/TRAINING PROGRAM

## 2024-10-30 PROCEDURE — 90656 IIV3 VACC NO PRSV 0.5 ML IM: CPT | Performed by: STUDENT IN AN ORGANIZED HEALTH CARE EDUCATION/TRAINING PROGRAM

## 2024-10-30 RX ORDER — MINOXIDIL 50 MG/G
AEROSOL, FOAM TOPICAL
Qty: 60 G | Refills: 3 | Status: SHIPPED | OUTPATIENT
Start: 2024-10-30

## 2024-12-20 ENCOUNTER — APPOINTMENT (OUTPATIENT)
Dept: PRIMARY CARE | Facility: CLINIC | Age: 24
End: 2024-12-20
Payer: COMMERCIAL

## 2025-01-07 ENCOUNTER — APPOINTMENT (OUTPATIENT)
Dept: PRIMARY CARE | Facility: CLINIC | Age: 25
End: 2025-01-07
Payer: COMMERCIAL

## 2025-01-28 ENCOUNTER — APPOINTMENT (OUTPATIENT)
Dept: PRIMARY CARE | Facility: CLINIC | Age: 25
End: 2025-01-28
Payer: COMMERCIAL

## 2025-01-28 VITALS
DIASTOLIC BLOOD PRESSURE: 73 MMHG | BODY MASS INDEX: 25.37 KG/M2 | SYSTOLIC BLOOD PRESSURE: 123 MMHG | HEIGHT: 71 IN | OXYGEN SATURATION: 95 % | WEIGHT: 181.2 LBS | HEART RATE: 62 BPM

## 2025-01-28 DIAGNOSIS — Z11.3 ROUTINE SCREENING FOR STI (SEXUALLY TRANSMITTED INFECTION): ICD-10-CM

## 2025-01-28 DIAGNOSIS — Z00.00 HEALTH MAINTENANCE EXAMINATION: Primary | ICD-10-CM

## 2025-01-28 DIAGNOSIS — Z13.0 SCREENING FOR DEFICIENCY ANEMIA: ICD-10-CM

## 2025-01-28 DIAGNOSIS — E55.9 MILD VITAMIN D DEFICIENCY: ICD-10-CM

## 2025-01-28 DIAGNOSIS — L64.9 MALE PATTERN BALDNESS: ICD-10-CM

## 2025-01-28 DIAGNOSIS — F41.9 ANXIETY: ICD-10-CM

## 2025-01-28 DIAGNOSIS — E78.1 HYPERTRIGLYCERIDEMIA: ICD-10-CM

## 2025-01-28 PROCEDURE — 99395 PREV VISIT EST AGE 18-39: CPT | Performed by: STUDENT IN AN ORGANIZED HEALTH CARE EDUCATION/TRAINING PROGRAM

## 2025-01-28 PROCEDURE — 3008F BODY MASS INDEX DOCD: CPT | Performed by: STUDENT IN AN ORGANIZED HEALTH CARE EDUCATION/TRAINING PROGRAM

## 2025-01-28 PROCEDURE — G2211 COMPLEX E/M VISIT ADD ON: HCPCS | Performed by: STUDENT IN AN ORGANIZED HEALTH CARE EDUCATION/TRAINING PROGRAM

## 2025-01-28 RX ORDER — MINOXIDIL 50 MG/G
AEROSOL, FOAM TOPICAL
Qty: 60 G | Refills: 3 | Status: SHIPPED | OUTPATIENT
Start: 2025-01-28

## 2025-01-28 ASSESSMENT — ENCOUNTER SYMPTOMS
DIFFICULTY URINATING: 0
VOMITING: 0
UNEXPECTED WEIGHT CHANGE: 0
JOINT SWELLING: 0
ABDOMINAL PAIN: 0
NUMBNESS: 0
NERVOUS/ANXIOUS: 1
WHEEZING: 0
DYSURIA: 0
DIARRHEA: 0
BLOOD IN STOOL: 0
NAUSEA: 0
FATIGUE: 0
COUGH: 0
LIGHT-HEADEDNESS: 0
FREQUENCY: 0
WEAKNESS: 0
FEVER: 0
EYE REDNESS: 0
RHINORRHEA: 0
POLYDIPSIA: 0
APPETITE CHANGE: 0
MYALGIAS: 0
CONSTIPATION: 0
PALPITATIONS: 1
DIZZINESS: 0
SHORTNESS OF BREATH: 0
CHILLS: 0
SORE THROAT: 0
BRUISES/BLEEDS EASILY: 0
WOUND: 0
EYE DISCHARGE: 0
HEMATURIA: 0
ADENOPATHY: 0
HEADACHES: 0
BACK PAIN: 0

## 2025-01-28 ASSESSMENT — PATIENT HEALTH QUESTIONNAIRE - PHQ9
SUM OF ALL RESPONSES TO PHQ9 QUESTIONS 1 AND 2: 0
1. LITTLE INTEREST OR PLEASURE IN DOING THINGS: NOT AT ALL
2. FEELING DOWN, DEPRESSED OR HOPELESS: NOT AT ALL

## 2025-01-28 ASSESSMENT — PAIN SCALES - GENERAL: PAINLEVEL_OUTOF10: 0-NO PAIN

## 2025-01-28 NOTE — PROGRESS NOTES
Rayray Alfaro is a 24 y.o. male seen in Clinic at /[facility] by Dr. Cosme Mendoza on 01/28/25 for routine care, as well as for management of the following chronic medical conditions: beta thalassemia minor, anxiety, male pattern hair loss. He presents today for annual physical.    # Male pattern hair loss: Last visit Oct 2024 discussed topical minoxidil. Was prescribed but he never picked it up but is interested in trying it now. He is seeing the dermatologist in March! Discussed that response can take 3-6months.     # Beta thalassemia minor: Has been taking his MVI w/ iron about 2x/wk. Typically takes it in the am but noticed that it makes him nauseous if he does not have a meal with it which is why he only takes it 2x/wk. Discussed taking it at night instead seeing as pt always has dinner.     # Anxiety: Has longstanding hx of anxiety. Previously saw a therapist and is interested in talking with one again to learn more about why he reacts to situations in a certain way and to understand more about his emotions. Otherwise anabelle well with his anxiety and has a good support system.     # R ear tinnitus: Previously reported mostly R ear tinnitus. 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   Recent job change the tinnitus has improved      ROS:  Review of Systems   Constitutional:  Negative for appetite change, chills, fatigue, fever and unexpected weight change.   HENT:  Positive for tinnitus. Negative for rhinorrhea and sore throat.    Eyes:  Positive for visual disturbance. Negative for discharge and redness.   Respiratory:  Negative for cough, shortness of breath and wheezing.    Cardiovascular:  Positive for palpitations. Negative for chest pain and leg swelling.   Gastrointestinal:  Negative for  abdominal pain, blood in stool, constipation, diarrhea, nausea and vomiting.   Endocrine: Negative for polydipsia and polyuria.   Genitourinary:  Negative for difficulty urinating, dysuria, frequency, hematuria and urgency.   Musculoskeletal:  Negative for back pain, joint swelling and myalgias.   Skin:  Negative for rash and wound.   Neurological:  Negative for dizziness, syncope, weakness, light-headedness, numbness and headaches.   Hematological:  Negative for adenopathy. Does not bruise/bleed easily.   Psychiatric/Behavioral:  Negative for behavioral problems. The patient is nervous/anxious.      Past Medical History: none   Past Medical History:   Diagnosis Date    Abdominal pain 04/13/2023    Acute right ankle pain 04/13/2023    Anxiety 04/13/2023    Dysfunction of left eustachian tube 07/27/2021    Ear pain 04/13/2023    Hyperbilirubinemia 04/13/2023    Left acute otitis media 04/13/2023    Marijuana abuse 04/13/2023    Osteochondritis dissecans 04/13/2023    Palpitations 04/13/2023    Sprain of right ankle 04/13/2023    Strep tonsillitis 04/13/2023    Stress fracture of tarsal bone of right foot 04/13/2023    Tinnitus 07/27/2021     Subspecialty Medical Care: prior ENT     Past Surgical History: urologic procedure in infancy   History reviewed. No pertinent surgical history.    Medications:   Current Outpatient Medications:     multivitamin tablet, Take 1 tablet by mouth once daily., Disp: , Rfl:     minoxidiL 5 % foam, Apply 1/2 capful 1-2 times daily. Topical application daily for 4 months may be necessary for hair growth., Disp: 60 g, Rfl: 3  Pharmacy: [facility] ([place])    Allergies: NKDA; seasonal allergies   No Known Allergies    Immunizations: Tdap 2020-->due 2030;   Flu 10/2024  Recommend COVID vaccines remain UTD     Family History:   - Breast Cancer (grandmother, late onset)   No family history on file.    Social History:   Home/Living Situation: lives at with mother, father, brother, and sister;  "feels safe at home   Education: [employer] for high school   Employment/Work/Vocational: works in music industry.  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 [employer]  Activities: music, hanging out with friends  Drug Use: MJ use daily, no tobacco use, rare alcohol use   Diet: rarely eat breakfast/lunch during the week, but has well balanced meal for dinner  Sexuality/Contraception/Menstrual History: consistently uses protection, no c/f STD  Suicide/Depression/Anxiety: known anxiety, coping well  Sleep: no sleep concerns  Legal/Guardian: mom and dad, home [phone]  Contact Information: [phone]    Visit Vitals  /73 (BP Location: Left arm, Patient Position: Sitting, BP Cuff Size: Adult)   Pulse 62   Ht 1.803 m (5' 11\")   Wt 82.2 kg (181 lb 3.2 oz)   SpO2 95%   BMI 25.27 kg/m²   Smoking Status Every Day   BSA 2.03 m²      PHYSICAL EXAM:   General: well appearing, NAD, pleasant and engaged in encounter    HEENT: NCAT, MMM   CV: RRR, no m/r/g  CHEST: L chest wall ingrown hair again w/ no surrounding erythema/drainage/tenderness on palpation  PULM: CTAB  ABD: soft, NT, ND  : no suprapubic or CVA tenderness   EXT: WWP, no significant edema   SKIN: no rashes noted   NEURO: A&Ox4, symmetric facies, no gross motor or sensory deficits, normal gait  PSYCH: pleasant mood, appropriate affect     Assessment/Plan    Rayray Alfaro is a 24 y.o. male seen in Clinic at /[facility] by Dr. Cosme Mendoza on 01/28/25 for routine care, as well as for management of the following chronic medical conditions: beta thalassemia minor, anxiety, male pattern hair loss. He presents today for annual physical.    Summarized plan:  - Updated labs today, including STD testing for screening purposes  - Ingrown hair removed today in clinic with manual manipulation and tweezers, tolerated well  - Resent topical minoxidil " script  - Referral to behavioral health for anxiety  - Recommend taking MVI w/ iron at night to help with side effects and improved compliance  - RTC in 1yr    CHRONIC MEDICAL CONDITIONS   #Tinnitus   -    - constantly exposed to high pitched sounds  - higher pitched ringing in R ear, some asymmetry in hearing   - no trauma to the ear that he is aware of, uses Q-tips  - has been seen by ENT in the past, as well as audiology (last in 2021)   - NOT wooshing, more ringing  - does have seasonal allergies, has not been taking Allegra recently, also has done Flonase in the past   [  ] previously referred to audiology, recommended follow up   - mild anemia in setting of Beta Thal minor, MCV in low 60s, may be slightly contributing: recommended continued compliance    #Beta Thal Minor  - recent CBC with mild microcytic anemia  - Iron supplementation   - long term MVI with iron   [  ] repeat CBC w/ diff today   [  ] recommend taking MVI w/ iron at night since making pt nauseous in am when taken w/o meal and therefore currently only taking 2x/wk    #Anxiety   - prior counseling  - may consider re-engagement; previously referred   - money is stressor  [  ] referral to behavioral health for anxiety    #Elevated Tgs  - per lipid screening in 2023  - 2/2024: chol 157, LDL 98,   - low HDL but reassuring TC and LDL  - healthy dietary habits discussed/reviewed  [  ] recommend continued balanced diet and exercise  [  ] repeat fasting lipid panel today    # Male pattern hair loss  - discussed tx options for hair loss; pt most interested in trying topical minoxidil and following up with derm  [  ] topical minoxidil  [  ] derm follow up, appt in March 2025      #Health Maintenance   - Vision, Hearing screens: audiology referral previously   - Counseling regarding alcohol/tobacco/drug use: MJ use, intermittent vaping   - Depression screen: +anxiety (prior counseling, interested in behavioral health referral)   - BMI,  Lipid, A1C screening and nutritional/exercise counseling: labs today  - Blood Pressure: WNL  - Safe Sexual Practices, STI, HIV screening: recent negative testing 04/2023    #Transition of Care/Young Adult Care  - Health Literacy Assessment: excellent   - Medications: Active medications reviewed and updated  - Allergies: Reviewed and updated   - Immunizations: Flu UTD, updated COVID booster advised       Aura Myrick MD  Internal Medicine PGY-3    Cosme Mendoza MD  Internal Medicine-Pediatrics   [facility] [place] [address], [address]  P: [phone], F: [phone]

## 2025-02-12 ENCOUNTER — APPOINTMENT (OUTPATIENT)
Dept: PRIMARY CARE | Facility: CLINIC | Age: 25
End: 2025-02-12
Payer: COMMERCIAL

## 2025-02-15 ENCOUNTER — OFFICE VISIT (OUTPATIENT)
Dept: URGENT CARE | Age: 25
End: 2025-02-15
Payer: COMMERCIAL

## 2025-02-15 VITALS
TEMPERATURE: 97.8 F | SYSTOLIC BLOOD PRESSURE: 122 MMHG | HEIGHT: 71 IN | OXYGEN SATURATION: 95 % | HEART RATE: 68 BPM | WEIGHT: 170 LBS | RESPIRATION RATE: 20 BRPM | BODY MASS INDEX: 23.8 KG/M2 | DIASTOLIC BLOOD PRESSURE: 71 MMHG

## 2025-02-15 DIAGNOSIS — J02.9 SORE THROAT: ICD-10-CM

## 2025-02-15 DIAGNOSIS — J06.9 VIRAL URI: Primary | ICD-10-CM

## 2025-02-15 LAB
POC BINAX NOW COVID SERIAL NUMBER: NORMAL
POC RAPID INFLUENZA A: NEGATIVE
POC RAPID INFLUENZA B: NEGATIVE
POC RAPID STREP: NEGATIVE

## 2025-02-15 ASSESSMENT — ENCOUNTER SYMPTOMS
SORE THROAT: 1
RHINORRHEA: 1

## 2025-02-15 NOTE — PATIENT INSTRUCTIONS
Your rapid strep test was negative today. We will call if the throat culture is positive in 1-2 days, then send an antibiotic to your pharmacy to treat strep.    Mucinex D with pseudoephedrine can be helpful for congestion.     I advise rest, fluids, Flonase nasal spray once daily, nasal saline rinses 2-3 times daily, humidifier in bedroom at night, Claritin or Zyrtec once daily for the next two weeks. This will help with post nasal drip    You may alternate motrin and tylenol over the counter as needed for pain. Please follow package instructions for dosing    You may dissolve 1 tsp of salt into warm water and gargle as needed for throat pain    Honey is good for coating and soothing the throat. You may eat 1 tsp honey straight up or dissolve into warm tea.

## 2025-02-15 NOTE — PROGRESS NOTES
"Subjective   Patient ID: Rayray Alfaro is a 24 y.o. male. They present today with a chief complaint of Sore Throat (Sx sore throat, glands feel swollen, ).    History of Present Illness  Pt presents with sore and red throat x 4-5 days. Associated congestion, runny nose, pnd. No sick contacts. Using advil and dayquil for sx. No difficulty swallowing. No fever chills cp sob dental pain ear pain drooling or change in voice       History provided by:  Patient  Sore Throat   Associated symptoms include congestion.       Past Medical History  Allergies as of 02/15/2025    (No Known Allergies)       (Not in a hospital admission)       Past Medical History:   Diagnosis Date    Abdominal pain 04/13/2023    Acute right ankle pain 04/13/2023    Anxiety 04/13/2023    Dysfunction of left eustachian tube 07/27/2021    Ear pain 04/13/2023    Hyperbilirubinemia 04/13/2023    Left acute otitis media 04/13/2023    Marijuana abuse 04/13/2023    Osteochondritis dissecans 04/13/2023    Palpitations 04/13/2023    Sprain of right ankle 04/13/2023    Strep tonsillitis 04/13/2023    Stress fracture of tarsal bone of right foot 04/13/2023    Tinnitus 07/27/2021       No past surgical history on file.     reports that he has been smoking cigarettes. He has been exposed to tobacco smoke. He uses smokeless tobacco. He reports current alcohol use of about 1.0 standard drink of alcohol per week. He reports current drug use. Drug: Marijuana.    Review of Systems  Review of Systems   HENT:  Positive for congestion, postnasal drip, rhinorrhea and sore throat.    All other systems reviewed and are negative.                                 Objective    Vitals:    02/15/25 1402   BP: 122/71   Pulse: 68   Resp: 20   Temp: 36.6 °C (97.8 °F)   SpO2: 95%   Weight: 77.1 kg (170 lb)   Height: 1.803 m (5' 11\")     No LMP for male patient.    Physical Exam  Vitals and nursing note reviewed.   Constitutional:       General: He is not in acute distress.     " Appearance: Normal appearance. He is not ill-appearing, toxic-appearing or diaphoretic.   HENT:      Head: Normocephalic and atraumatic.      Right Ear: Tympanic membrane, ear canal and external ear normal.      Left Ear: Tympanic membrane, ear canal and external ear normal.      Nose: Nose normal.      Right Turbinates: Not enlarged, swollen or pale.      Left Turbinates: Not enlarged, swollen or pale.      Right Sinus: No maxillary sinus tenderness or frontal sinus tenderness.      Left Sinus: No maxillary sinus tenderness or frontal sinus tenderness.      Mouth/Throat:      Lips: Pink.      Mouth: Mucous membranes are moist.      Dentition: Normal dentition. No dental abscesses.      Tongue: No lesions. Tongue does not deviate from midline.      Palate: No mass and lesions.      Pharynx: Uvula midline. Posterior oropharyngeal erythema and postnasal drip present. No pharyngeal swelling, oropharyngeal exudate or uvula swelling.      Tonsils: No tonsillar exudate or tonsillar abscesses. 2+ on the right. 2+ on the left.      Comments: No uvular edema or deviation. No tonsillar edema, asymmetry, exudates or evidence of PTA. No trismus drooling or stridor. Speaking in full and clear sentences. Airway is patent. Tolerating oral secretions. No dental abnormality. No neck swelling. No sublingual or submandibular induration edema or tenderness.   Neurological:      Mental Status: He is alert.         Procedures    Point of Care Test & Imaging Results from this visit  Results for orders placed or performed in visit on 02/15/25   POCT rapid strep A manually resulted   Result Value Ref Range    POC Rapid Strep Negative Negative   POCT Covid-19 Rapid Antigen   Result Value Ref Range    Binax NOW Covid Serial Number normal    POCT Influenza A/B manually resulted   Result Value Ref Range    POC Rapid Influenza A Negative Negative    POC Rapid Influenza B Negative Negative      No results found.    Diagnostic study results (if  any) were reviewed by Allie Ortiz PA-C.    Assessment/Plan   Allergies, medications, history, and pertinent labs/EKGs/Imaging reviewed by Allie Ortiz PA-C.     Medical Decision Making  Suspect viral etiology  Advised supportive care  Will contact pt and send abx if strep pcr positive  Return or follow up with pcp as needed     Orders and Diagnoses  Diagnoses and all orders for this visit:  Viral URI  Sore throat  -     POCT rapid strep A manually resulted  -     POCT Covid-19 Rapid Antigen  -     POCT Influenza A/B manually resulted  -     Group A Streptococcus, PCR      Medical Admin Record      Patient disposition: Home    Electronically signed by Allie Ortiz PA-C  3:06 PM

## 2025-02-16 LAB — S PYO DNA THROAT QL NAA+PROBE: NOT DETECTED

## 2025-03-10 ENCOUNTER — APPOINTMENT (OUTPATIENT)
Dept: DERMATOLOGY | Facility: CLINIC | Age: 25
End: 2025-03-10
Payer: COMMERCIAL

## 2026-01-28 ENCOUNTER — APPOINTMENT (OUTPATIENT)
Dept: PRIMARY CARE | Facility: CLINIC | Age: 26
End: 2026-01-28
Payer: COMMERCIAL